# Patient Record
Sex: FEMALE | Race: BLACK OR AFRICAN AMERICAN | Employment: FULL TIME | URBAN - METROPOLITAN AREA
[De-identification: names, ages, dates, MRNs, and addresses within clinical notes are randomized per-mention and may not be internally consistent; named-entity substitution may affect disease eponyms.]

---

## 2022-07-06 ENCOUNTER — HOSPITAL ENCOUNTER (EMERGENCY)
Age: 22
Discharge: HOME | End: 2022-07-06
Attending: EMERGENCY MEDICINE
Payer: MEDICAID

## 2022-07-06 VITALS
DIASTOLIC BLOOD PRESSURE: 86 MMHG | BODY MASS INDEX: 29.82 KG/M2 | WEIGHT: 190 LBS | HEIGHT: 67 IN | OXYGEN SATURATION: 98 % | SYSTOLIC BLOOD PRESSURE: 135 MMHG | TEMPERATURE: 98.8 F | HEART RATE: 87 BPM | RESPIRATION RATE: 16 BRPM

## 2022-07-06 DIAGNOSIS — L03.012 PARONYCHIA OF FINGER OF LEFT HAND: Primary | ICD-10-CM

## 2022-07-06 LAB — HCG UR QL: NEGATIVE

## 2022-07-06 PROCEDURE — 99283 EMERGENCY DEPT VISIT LOW MDM: CPT

## 2022-07-06 PROCEDURE — 81025 URINE PREGNANCY TEST: CPT

## 2022-07-06 PROCEDURE — 10060 I&D ABSCESS SIMPLE/SINGLE: CPT

## 2022-07-06 RX ORDER — AMOXICILLIN AND CLAVULANATE POTASSIUM 875; 125 MG/1; MG/1
1 TABLET, FILM COATED ORAL 2 TIMES DAILY
Qty: 14 TABLET | Refills: 0 | Status: SHIPPED | OUTPATIENT
Start: 2022-07-06 | End: 2022-07-13

## 2022-07-06 RX ORDER — LIDOCAINE HYDROCHLORIDE 10 MG/ML
10 INJECTION, SOLUTION INFILTRATION; PERINEURAL ONCE
Status: DISCONTINUED | OUTPATIENT
Start: 2022-07-06 | End: 2022-07-06 | Stop reason: HOSPADM

## 2022-07-06 RX ORDER — BACITRACIN, NEOMYCIN, POLYMYXIN B 400; 3.5; 5 [USP'U]/G; MG/G; [USP'U]/G
OINTMENT TOPICAL
Qty: 1 EACH | Refills: 0 | Status: SHIPPED | OUTPATIENT
Start: 2022-07-06 | End: 2022-07-16

## 2022-07-06 ASSESSMENT — PAIN SCALES - GENERAL
PAINLEVEL_OUTOF10: 6
PAINLEVEL_OUTOF10: 7

## 2022-07-06 ASSESSMENT — PAIN DESCRIPTION - PAIN TYPE: TYPE: ACUTE PAIN

## 2022-07-06 ASSESSMENT — PAIN DESCRIPTION - DESCRIPTORS
DESCRIPTORS: THROBBING
DESCRIPTORS: SHOOTING;SHARP

## 2022-07-06 ASSESSMENT — PAIN DESCRIPTION - LOCATION: LOCATION: FINGER (COMMENT WHICH ONE)

## 2022-07-06 ASSESSMENT — PAIN DESCRIPTION - ORIENTATION
ORIENTATION: LEFT
ORIENTATION: LEFT

## 2022-07-06 ASSESSMENT — PAIN DESCRIPTION - FREQUENCY: FREQUENCY: CONTINUOUS

## 2022-07-06 ASSESSMENT — PAIN - FUNCTIONAL ASSESSMENT: PAIN_FUNCTIONAL_ASSESSMENT: 0-10

## 2022-07-06 NOTE — ED TRIAGE NOTES
Since Sunday having pain/swelling to left 4th digit finger, reports numbness in the finger. +PMS to left hand.  Denies injuries/trauma/drainage

## 2022-07-06 NOTE — ED PROVIDER NOTES
[facility] Emergency Department Provider Note                   PCP:                None Provider               Age: 24 y.o. Sex: female       ICD-10-CM    1. Paronychia of finger of left hand  L03.012        DISPOSITION Decision To Discharge 07/06/2022 03:35:29 PM        MDM  Number of Diagnoses or Management Options  Paronychia of finger of left hand: minor  Diagnosis management comments: Pt with paronychia to L 4th finger. Denies recent trauma or injury. VSS. FROM. NVID. Digital block performed and I&D of site w/ purulent drainage. NVID s/p procedure. Discharge home with Augmentin and bacitracin ointment. Instructed to apply warm compresses and schedule close follow-up with PCP for recheck. Amount and/or Complexity of Data Reviewed  Tests in the medicine section of CPT®: ordered and reviewed  Review and summarize past medical records: yes    Risk of Complications, Morbidity, and/or Mortality  Presenting problems: low  Diagnostic procedures: low  Management options: low    Patient Progress  Patient progress: stable       Orders Placed This Encounter   Procedures    INCISION AND DRAINAGE    Pregnancy, Urine        Julisa Diehl is a 24 y.o. female who presents to the Emergency Department with chief complaint of left 4th finger pain. Chief Complaint   Patient presents with    Finger Pain      25 yo F presents with c/o swelling and pain to distal aspect of L 4th finger that has progressively worsened since Sunday. Denies any recent trauma or injury. Denies any significant past medical history. Denies history of diabetes. In contrast to triage documentation, denies numbness to finger. Denies difficulty moving finger or limited range of motion. Reports increased erythema/redness to site. Denies fever, chills, drainage from site. The history is provided by the patient. No  was used.    Hand Problem  Location:  Finger  Finger location:  L ring finger  Injury: no    Pain details:     Quality:  Aching and throbbing    Radiates to:  Does not radiate    Severity:  Moderate    Onset quality:  Gradual    Timing:  Constant    Progression:  Unchanged  Dislocation: no    Foreign body present:  No foreign bodies  Prior injury to area:  No  Relieved by:  Nothing  Worsened by: Movement (palpation to area)  Ineffective treatments:  Immobilization and rest (warm compresses )  Associated symptoms: swelling    Associated symptoms: no decreased range of motion, no fatigue, no fever, no muscle weakness, no numbness, no stiffness and no tingling          Review of Systems   Constitutional: Negative for chills, fatigue and fever. Respiratory: Negative for cough. Gastrointestinal: Negative for nausea and vomiting. Genitourinary: Negative for dysuria and pelvic pain. Musculoskeletal: Positive for arthralgias. Negative for joint swelling and stiffness. Skin: Positive for color change and rash. Neurological: Negative for weakness and headaches. Hematological: Does not bruise/bleed easily. No past medical history on file. No past surgical history on file. No family history on file. Social Connections:     Frequency of Communication with Friends and Family: Not on file    Frequency of Social Gatherings with Friends and Family: Not on file    Attends Yazidism Services: Not on file    Active Member of Clubs or Organizations: Not on file    Attends Club or Organization Meetings: Not on file    Marital Status: Not on file        No Known Allergies     Vitals signs and nursing note reviewed. No data found. Physical Exam  Vitals and nursing note reviewed. Constitutional:       Appearance: Normal appearance. HENT:      Head: Normocephalic. Mouth/Throat:      Mouth: Mucous membranes are moist.   Eyes:      Extraocular Movements: Extraocular movements intact. Cardiovascular:      Rate and Rhythm: Normal rate. Pulses: Normal pulses.    Pulmonary: Effort: Pulmonary effort is normal.      Breath sounds: Normal breath sounds. Abdominal:      Palpations: Abdomen is soft. Tenderness: There is no abdominal tenderness. Musculoskeletal:      Right hand: Normal.      Left hand: Swelling and tenderness present. No lacerations. Normal range of motion. Normal strength. Normal sensation. Hands:       Comments: Swelling and erythema noted to distal aspect of L 4th digit at lateral fold. FROM L 4th digits. No crepitus. NVID. Cap refill <3 seconds. Normal sensation. No nail injury. No significant swelling or tenderness noted to pad of L 4th digit. Skin:     General: Skin is warm. Findings: Erythema present. Neurological:      General: No focal deficit present. Mental Status: She is alert and oriented to person, place, and time. Cranial Nerves: No cranial nerve deficit. Sensory: No sensory deficit. Motor: No weakness. Incision/Drainage    Date/Time: 7/7/2022 2:18 AM  Performed by: Stacy Garcia MD  Authorized by: Stacy Garcia MD     Consent:     Consent obtained:  Verbal    Consent given by:  Patient    Risks discussed:  Bleeding, incomplete drainage, pain, damage to other organs and infection  Location:     Indications for incision and drainage: paronychia to L 4th finger. Size:  1 cm     Location:  Upper extremity    Upper extremity location:  Finger    Finger location:  L ring finger  Pre-procedure details:     Skin preparation:  Betadine  Anesthesia (see MAR for exact dosages): Anesthesia method:  Local infiltration (Digital block )  Procedure type:     Complexity:  Simple  Procedure details:     Incision types:  Single straight    Incision depth:  Dermal    Scalpel blade:  11    Wound management:  Irrigated with saline    Drainage:  Purulent    Drainage amount:   Moderate    Wound treatment:  Wound left open    Packing materials:  None  Post-procedure details:     Patient tolerance of procedure: Tolerated well, no immediate complications  Comments:      Results Include:    Recent Results (from the past 24 hour(s))  -Pregnancy, Urine:   Collection Time: 07/06/22  3:37 PM       Result                      Value             Ref Range           HCG(Urine) Pregnancy T*     Negative                                   Labs Reviewed   PREGNANCY, URINE        No orders to display                          Voice dictation software was used during the making of this note. This software is not perfect and grammatical and other typographical errors may be present. This note has not been completely proofread for errors.      Martin Romero MD  07/07/22 0506

## 2022-07-06 NOTE — ED NOTES
I have reviewed discharge instructions with the patient. The patient verbalized understanding. Patient left ED via Discharge Method: ambulatory to Home with self. Opportunity for questions and clarification provided. Patient given 2 scripts. To continue your aftercare when you leave the hospital, you may receive an automated call from our care team to check in on how you are doing. This is a free service and part of our promise to provide the best care and service to meet your aftercare needs.  If you have questions, or wish to unsubscribe from this service please call [phone]. Thank you for Choosing our [facility] Emergency Department.         German Awan RN  07/06/22 [id number]

## 2022-07-07 ASSESSMENT — ENCOUNTER SYMPTOMS
NAUSEA: 0
VOMITING: 0
COUGH: 0
COLOR CHANGE: 1

## 2023-01-08 ENCOUNTER — HOSPITAL ENCOUNTER (EMERGENCY)
Age: 23
Discharge: HOME | End: 2023-01-08
Attending: EMERGENCY MEDICINE
Payer: MEDICAID

## 2023-01-08 VITALS
HEIGHT: 67 IN | HEART RATE: 100 BPM | DIASTOLIC BLOOD PRESSURE: 59 MMHG | SYSTOLIC BLOOD PRESSURE: 120 MMHG | TEMPERATURE: 98.6 F | BODY MASS INDEX: 30.61 KG/M2 | WEIGHT: 195 LBS | RESPIRATION RATE: 18 BRPM | OXYGEN SATURATION: 97 %

## 2023-01-08 DIAGNOSIS — Z48.02 VISIT FOR SUTURE REMOVAL: Primary | ICD-10-CM

## 2023-01-08 PROCEDURE — 99282 EMERGENCY DEPT VISIT SF MDM: CPT

## 2023-01-08 ASSESSMENT — ENCOUNTER SYMPTOMS
NAUSEA: 0
SHORTNESS OF BREATH: 0
COUGH: 0
BACK PAIN: 0
VOMITING: 0
SORE THROAT: 0
ABDOMINAL PAIN: 0

## 2023-01-08 ASSESSMENT — PAIN - FUNCTIONAL ASSESSMENT: PAIN_FUNCTIONAL_ASSESSMENT: NONE - DENIES PAIN

## 2023-01-08 NOTE — ED PROVIDER NOTES
Emergency Department Provider Note                   PCP:                On File Not (Inactive)               Age: 25 y.o. Sex: female       ICD-10-CM    1. Visit for suture removal  Z48.02           DISPOSITION Decision To Discharge 01/08/2023 01:54:17 PM        Robin Arredondo is a 25 y.o. female who presents to the Emergency Department with chief complaint of    Chief Complaint   Patient presents with    Suture / Staple Removal      Patient is a 51-year-old female who presents this department for chief complaint of suture removal.  She had sutures placed at an outside facility 5 days ago. They are located in her left eyebrow. She was told to present to local emergency room in 5 days for suture movable. She has had an unremarkable healing course. The history is provided by the patient. No  was used. Review of Systems   Constitutional:  Negative for chills and fever. HENT:  Negative for congestion and sore throat. Respiratory:  Negative for cough and shortness of breath. Cardiovascular:  Negative for chest pain and palpitations. Gastrointestinal:  Negative for abdominal pain, nausea and vomiting. Genitourinary:  Negative for dysuria and vaginal discharge. Musculoskeletal:  Negative for back pain. Skin:  Positive for wound. Psychiatric/Behavioral:  Negative for agitation. All other systems reviewed and are negative. History reviewed. No pertinent past medical history. History reviewed. No pertinent surgical history. History reviewed. No pertinent family history. Social History     Socioeconomic History    Marital status: Single     Spouse name: None    Number of children: None    Years of education: None    Highest education level: None         Patient has no known allergies. There are no discharge medications for this patient. Vitals signs and nursing note reviewed.    Patient Vitals for the past 4 hrs:   Temp Pulse Resp BP SpO2 01/08/23 1323 98.6 °F (37 °C) 100 18 (!) 120/59 97 %          Physical Exam  Vitals and nursing note reviewed. Constitutional:       General: She is not in acute distress. Appearance: Normal appearance. She is not ill-appearing, toxic-appearing or diaphoretic. HENT:      Head: Normocephalic and atraumatic. Nose: Nose normal.      Mouth/Throat:      Mouth: Mucous membranes are moist.   Eyes:      Pupils: Pupils are equal, round, and reactive to light. Cardiovascular:      Rate and Rhythm: Normal rate. Pulmonary:      Effort: Pulmonary effort is normal. No respiratory distress. Abdominal:      General: Abdomen is flat. Palpations: Abdomen is soft. Tenderness: There is no abdominal tenderness. Musculoskeletal:         General: Normal range of motion. Cervical back: Normal range of motion. No rigidity. Skin:     General: Skin is warm. Comments: Suture in place in left eyebrow   Neurological:      General: No focal deficit present. Mental Status: She is alert. Psychiatric:         Mood and Affect: Mood normal.        Suture Removal    Date/Time: 1/8/2023 1:53 PM  Performed by: LADARIUS Adams  Authorized by: Greer Mccord DO     Consent:     Consent obtained:  Verbal    Consent given by:  Patient    Risks discussed:  Bleeding, pain and wound separation  Universal protocol:     Patient identity confirmed:  Verbally with patient  Procedure details:     Wound appearance:  No signs of infection    Number of sutures removed:  14  Post-procedure details:     Post-removal:  Antibiotic ointment applied    Procedure completion:  Tolerated    Medical Decision Making  Amount and/or Complexity of Data Reviewed  ECG/medicine tests: ordered and independent interpretation performed. Complexity of Problem: 1 self limited or minor problem.  (2)  I have reviewed records from an external source: ED records from outside this hospital.  Considerations: Shared decision making was utilized in the care of this patient. We discussed care recommended by provider that patient declined (tests, disposition, etc). We discussed care requested by patient that the provider declined (includes tests, admit, dc, antibiotics, etc). Patient was discharged risks and benefits of hospitalization were discussed. Orders Placed This Encounter   Procedures    SUTURE REMOVAL        Medications - No data to display    There are no discharge medications for this patient. Results for orders placed or performed during the hospital encounter of 01/08/23   Suture Removal    Narrative    Kathi Rosas     1/8/2023  1:54 PM  Suture Removal    Date/Time: 1/8/2023 1:53 PM  Performed by: LADARIUS Rosas  Authorized by: Sravani Trimble DO     Consent:     Consent obtained:  Verbal    Consent given by:  Patient    Risks discussed:  Bleeding, pain and wound separation  Universal protocol:     Patient identity confirmed:  Verbally with patient  Procedure details:     Wound appearance:  No signs of infection    Number of sutures removed:  14  Post-procedure details:     Post-removal:  Antibiotic ointment applied    Procedure completion:  Tolerated        No orders to display                       Voice dictation software was used during the making of this note. This software is not perfect and grammatical and other typographical errors may be present. This note has not been completely proofread for errors.      LADARIUS Rosas  01/08/23 [address] [place] [address]  01/08/23 [id number]

## 2023-01-08 NOTE — ED TRIAGE NOTES
Pt states she had 14 sutures placed above left eyebrow about 5 days ago, denies any redness swelling or drainage. Had hit head on side of table.